# Patient Record
Sex: MALE | Race: ASIAN | NOT HISPANIC OR LATINO | Employment: UNEMPLOYED | URBAN - METROPOLITAN AREA
[De-identification: names, ages, dates, MRNs, and addresses within clinical notes are randomized per-mention and may not be internally consistent; named-entity substitution may affect disease eponyms.]

---
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
## 2025-03-24 ENCOUNTER — TELEPHONE (OUTPATIENT)
Age: 20
End: 2025-03-24

## 2025-03-25 ENCOUNTER — TELEPHONE (OUTPATIENT)
Age: 20
End: 2025-03-25

## 2025-03-25 NOTE — TELEPHONE ENCOUNTER
Pt stated for tax purposes pt needs a note from PCP to state that he still lives with his parents. Please contact pt when it is done. Thank you for your help.